# Patient Record
Sex: FEMALE | Race: OTHER | NOT HISPANIC OR LATINO
[De-identification: names, ages, dates, MRNs, and addresses within clinical notes are randomized per-mention and may not be internally consistent; named-entity substitution may affect disease eponyms.]

---

## 2019-02-15 ENCOUNTER — APPOINTMENT (OUTPATIENT)
Dept: ENDOCRINOLOGY | Facility: CLINIC | Age: 79
End: 2019-02-15
Payer: MEDICARE

## 2019-02-15 VITALS
SYSTOLIC BLOOD PRESSURE: 147 MMHG | HEART RATE: 59 BPM | BODY MASS INDEX: 22.63 KG/M2 | HEIGHT: 62 IN | WEIGHT: 123 LBS | DIASTOLIC BLOOD PRESSURE: 65 MMHG

## 2019-02-15 PROCEDURE — 99205 OFFICE O/P NEW HI 60 MIN: CPT

## 2019-02-19 LAB
24R-OH-CALCIDIOL SERPL-MCNC: 131 PG/ML
25(OH)D3 SERPL-MCNC: 23.9 NG/ML
ALBUMIN SERPL ELPH-MCNC: 4.4 G/DL
ALP BLD-CCNC: 123 U/L
ALP BONE SERPL-MCNC: 32 MCG/L
ALT SERPL-CCNC: 13 U/L
ANION GAP SERPL CALC-SCNC: 10 MMOL/L
AST SERPL-CCNC: 11 U/L
BILIRUB SERPL-MCNC: 0.4 MG/DL
BUN SERPL-MCNC: 10 MG/DL
CALCIUM SERPL-MCNC: 11.6 MG/DL
CHLORIDE SERPL-SCNC: 109 MMOL/L
CHOLEST SERPL-MCNC: 180 MG/DL
CHOLEST/HDLC SERPL: 2.9 RATIO
CO2 SERPL-SCNC: 26 MMOL/L
CREAT SERPL-MCNC: 0.67 MG/DL
CREAT SPEC-SCNC: 49 MG/DL
GLUCOSE SERPL-MCNC: 107 MG/DL
HBA1C MFR BLD HPLC: 7.1 %
HDLC SERPL-MCNC: 62 MG/DL
LDLC SERPL CALC-MCNC: 105 MG/DL
MICROALBUMIN 24H UR DL<=1MG/L-MCNC: <1.2 MG/DL
MICROALBUMIN/CREAT 24H UR-RTO: NORMAL
POTASSIUM SERPL-SCNC: 4.6 MMOL/L
PROT SERPL-MCNC: 6.7 G/DL
SODIUM SERPL-SCNC: 145 MMOL/L
TRIGL SERPL-MCNC: 66 MG/DL

## 2019-02-25 NOTE — CONSULT LETTER
[Dear  ___] : Dear  [unfilled], [Consult Letter:] : I had the pleasure of evaluating your patient, [unfilled]. [Please see my note below.] : Please see my note below. [Consult Closing:] : Thank you very much for allowing me to participate in the care of this patient.  If you have any questions, please do not hesitate to contact me. [Sincerely,] : Sincerely, [FreeTextEntry1] : Ms. Schaeffer has good control of her sugars; her A1c is 7.1%.  I switched her Jardiance because of insurance reasons.  I gave her samples and so far, she is not having any side effects to Jardiance.\par She also has hypercalcemia from primary hyperparathyroidism.  Because of her age, I am not eager to send her for surgery, but her calcium levels are higher than I would like (they are over 11.5).  I advised her to avoid dehydration, which will increase calcium levels, and she should keep her vitamin D replete (i recommended she start taking 400 IU/day).  I am also sending her for a bone density to see if she has osteoporosis.\par \par  [FreeTextEntry3] : Najma Del Angel MD\par Division of Endocrinology\par [facility] Physician [facility]

## 2019-02-25 NOTE — ASSESSMENT
[FreeTextEntry1] : Diabetes, A1c at goal (goal < 7.5%).  continue current regimen, ok to change SGLT2 to whichever one is covered by her insurance.  if Farxiga not covered, will try Jardiance 10mg, samples given.  [employer] did report a small risk of fractures in one of their studies, but I believe a repeat study did not have this finding.\par can reduce testing to 4x/week: twice in the morning, twice after meals/bedtime\par \par Hypercalcemia, due to primary hyperparathyroidism. Her calcium level is quite high, and higher than what is usually considered comfortable for monitoring (over 11.5). However, at her age, I am not eager to send her for surgery (parathyroidectomy).  Advised to keep well hydrated, as dehydration will exacerbate hypercalcemia.  Keep vitamin D replete, goal 25 D > 25 ng/ml.   send for bone density to check for osteoporosis.  if bone density is very low, or if calcium is not stable (going over 12.0), then will refer for surgical evaluation. \par RTO 3 months

## 2019-02-25 NOTE — HISTORY OF PRESENT ILLNESS
[FreeTextEntry1] : Diabetes diagnosed in 1998 after presenting to GYN with recurrent yeast infections.  Both parents had diabetes and son was diagnosed with diabetes a few years ago (he is 61yo now).\par tests glucose 1-2x/day.\par Ultra 2 meter: 14d 130 (n14), 30d 125 (n27). \par testing mostly after she wakes up: 133 (today), 159, 126, 120, 103, 140, 107, 97, 136, 87\par pm: 121 (7p), 99 (12p), 268 (3p)\par feels "funny" when glucose are high but no dizzines or blurry vision.\par no hypoglycemia.  has hypo awareness.\par always urinates a lot.  drinking 4 500ml bottles of water per day\par no neuropathy symptom.  she used to get stabbing pains in her abdomen but this stopped after taking folate\par up to date with ophtho, no DR. has glaucoma\par no chest pain or SOB.\par has known elevated calcium for years.  last bone density was a while ago. no recent falls, no h/o fractures. no h/o stones\par had to pay out of pocket for Farxiga last month, over $500.  used to be on Invokana for 8-9 months but then stopped by Dr Pereira because of possible inc fracture risk.  then changed to farxiga. She did not have any side effects to either med.  has been on Farxiga the past 2 years.\par \par PMH: type 2 diabetes \par hypercalcemia due to primary hyperparathyroidism.  Ca 11-12 range.\par \par Meds:\par Farxiga 5mg, Onglyza 5mg hs\par repaglidine takes with lunch and dinner\par Benicar/HCT 40/12.5\par cod liver oil, 1 tablespoon/day

## 2019-02-25 NOTE — PHYSICAL EXAM
[Alert] : alert [Healthy Appearance] : healthy appearance [Normal Voice/Communication] : normal voice communication [No Proptosis] : no proptosis [No Lid Lag] : no lid lag [Normal Hearing] : hearing was normal [Thyroid Not Enlarged] : the thyroid was not enlarged [No Thyroid Nodules] : there were no palpable thyroid nodules [Clear to Auscultation] : lungs were clear to auscultation bilaterally [Normal S1, S2] : normal S1 and S2 [Regular Rhythm] : with a regular rhythm [Pedal Pulses Normal] : the pedal pulses are present [No Edema] : there was no peripheral edema [Normal Sensation on Monofilament Testing] : normal sensation on monofilament testing of lower extremities [Normal Affect] : the affect was normal [Normal Mood] : the mood was normal [Foot Ulcers] : no foot ulcers [de-identified] : regular bradycardia [de-identified] : dystrophic nails

## 2019-02-25 NOTE — DATA REVIEWED
[FreeTextEntry1] : 7/18: A1c 6.8%, Ca 11.4, tot chol 204, trig 102, HDL 60, , Cr 0.79, urine microalbumin/ Cr 10.1\par 3/18: A1c 7.1%, Ca 12.1, Cr 0.80, tot chol 223, trig 56, HDL 71, , TSH 0.79\par 10/17: A1c 7.5%, Ca 12.0, , Cr 0.70, 25D 19

## 2019-03-15 ENCOUNTER — RESULT REVIEW (OUTPATIENT)
Age: 79
End: 2019-03-15

## 2019-03-15 RX ORDER — RISEDRONATE SODIUM 150 MG/1
150 TABLET, FILM COATED ORAL
Qty: 3 | Refills: 3 | Status: ACTIVE | COMMUNITY
Start: 2019-03-15 | End: 1900-01-01

## 2019-03-29 ENCOUNTER — CLINICAL ADVICE (OUTPATIENT)
Age: 79
End: 2019-03-29

## 2019-03-29 RX ORDER — EMPAGLIFLOZIN 10 MG/1
10 TABLET, FILM COATED ORAL
Qty: 30 | Refills: 5 | Status: DISCONTINUED | OUTPATIENT
Start: 2019-02-15 | End: 2019-03-29

## 2019-03-29 RX ORDER — DAPAGLIFLOZIN 5 MG/1
5 TABLET, FILM COATED ORAL
Qty: 30 | Refills: 5 | Status: ACTIVE | COMMUNITY
Start: 2019-03-29 | End: 1900-01-01

## 2019-04-25 ENCOUNTER — CLINICAL ADVICE (OUTPATIENT)
Age: 79
End: 2019-04-25

## 2019-08-09 ENCOUNTER — APPOINTMENT (OUTPATIENT)
Dept: ENDOCRINOLOGY | Facility: CLINIC | Age: 79
End: 2019-08-09

## 2019-09-30 ENCOUNTER — APPOINTMENT (OUTPATIENT)
Dept: OTOLARYNGOLOGY | Facility: CLINIC | Age: 79
End: 2019-09-30
Payer: MEDICARE

## 2019-09-30 VITALS
HEART RATE: 50 BPM | DIASTOLIC BLOOD PRESSURE: 79 MMHG | OXYGEN SATURATION: 98 % | RESPIRATION RATE: 16 BRPM | SYSTOLIC BLOOD PRESSURE: 150 MMHG | TEMPERATURE: 98.3 F

## 2019-09-30 PROCEDURE — 31575 DIAGNOSTIC LARYNGOSCOPY: CPT

## 2019-09-30 PROCEDURE — 76536 US EXAM OF HEAD AND NECK: CPT

## 2019-09-30 PROCEDURE — 99205 OFFICE O/P NEW HI 60 MIN: CPT | Mod: 25

## 2019-09-30 PROCEDURE — 99204 OFFICE O/P NEW MOD 45 MIN: CPT | Mod: 25

## 2019-10-01 NOTE — PROCEDURE
[Image(s) Captured] : image(s) captured and filed [Gag Reflex] : gag reflex preventing mirror examination [Unable to Cooperate with Mirror] : patient unable to cooperate with mirror [Topical Lidocaine] : topical lidocaine [None] : none [Serial Number: ___] : Serial Number: [unfilled] [FreeTextEntry3] : \par [facility]\par THYROID/NECK ULTRASOUND REPORT\par \par NAME: AMELIA DESAI   ..       MR# [id number] ..  : 1940 .     .DATE: 2019\par \par HISTORY/ INDICATIONS: An 79-year-old female with well documented primary hyperparathyroidism and osteoporosis to assess the thyroid gland for nodularity or a parathyroid adenoma.\par \par COMPARISON: None\par \par PROCEDURE: Physician performed high-resolution ultrasound gray scale imaging and color Doppler supplementation of the thyroid gland and neck was obtained in the longitudinal and transverse planes using a 13 MHz linear transducer with image capture.  All measurements are in centimeters (longitudinal x AP x transverse).  \par \par FINDINGS: Overall the thyroid gland is normal in size, heterogeneous in echotexture with bilateral subcentimeter thyroid nodules and a good candidate for a left inferior parathyroid adenoma. Vascularity is normal on color Doppler flow. \par \par RIGHT LOBE: Is not enlarged, heterogeneous, with normal vascularity on color Doppler and measures 4.39 x 1.20 x 2.00 cm.  NODULES: Within the right superior lobe there is an anterior smoothly marginated mildly hypoechoic nodule that is wider than tall with grade 1 vascularity and measures 0.41 x 0.16 x 0.41 cm. 2 additional identical appearing nodules were noted in the right mid posterior lobe and right inferior lobe measuring 0.41 x 0.22 x 0.4 to and 0.34 x 0.20 x 0.46 cm.  None of these nodules contain microcalcifications are likely benign colloid nodules.\par \par ISTHMUS: Measures 0.15 cm in AP dimension and is heterogeneous in echotexture with normal vascularity.  No nodules are identified.\par \par LEFT LOBE: Is not enlarged, heterogeneous, with normal vascularity on color Doppler and measures 4.50 x 1.35 x 1.85 cm. NODULES:Within the left mid to upper lobe is a well circumscribed, hypoechoic solid nodule that is wider than tall with grade 1 vascularity and measures 0.30 x 0.17 x 0.29 CM without microcalcifications. A second mildly hypoechoic smoothly marginated solid nodule is identified in the left mid to inferior lobe posteriorly and just anterior and adjacent to a probable subcapsular parathyroid adenoma.It has grade 1 vascularity, is wider than tall, and measures 0.66 x 0.32 x 0.59 cm. It contains several punctate echogenic foci that may represent colloid crystals or microcalcifications. \par \par PARATHYROID GLANDS: Posterior to the left mid to lower thyroid lobe is an oblong, smoothly marginated, hypoechoic structure with an echogenic line of separation from an overlying mildly hypoechoic heterogeneous subcentimeter thyroid nodule that has a vascular pedicle and measures 1.15 x 0.26 x 0.73 cm. This is a likely candidate for a parathyroid adenoma that may be subcapsular. There are no other identified enlarged parathyroid glands in the central neck compartment. \par \par LYMPH NODES: There are several benign appearing subcentimeter lymph nodes identified at neck levels III bilaterally, all with echogenic hilar lines and a short long axis ratio < 0.5 in the transverse plane.\par \par IMPRESSION: 79-year-old female with well documented primary hyperparathyroidism and evidence for a left mid to lower positioned parathyroid adenoma and subcentimeter thyroid nodules bilaterally. \par \par RECOMMENDATIONS: A  4-D CT scan of the neck should be obtained for further confirmation prior to a minimally invasive parathyroidectomy. \par \par Electronically signed by Ned Harman MD on 19, TIME: 3:01 PM [de-identified] : The nasal septum is minimally deviated to the right. There are no masses or polyps and the nasal mucosa and secretions are normal. The choanae and posterior nasopharynx are normal without masses or drainage. The Eustachian tube orifices appear patent. The pharynx, including the posterior and lateral pharyngeal walls, the vallecula and base of tongue are normal without ulcerations, lesions or masses. The hypopharynx including the pyriform sinuses open well without pooling of secretions, mucosal lesions or masses. The supraglottic larynx including the epiglottis, petiole, arytenoids, glossoepiglottic, aryepiglottic and pharyngoepiglottic folds are normal without mucosal lesions, ulcerations or masses. The glottis reveals normal false vocal folds. The true vocal folds are glistening white, tense and of equal length, without paralysis, having symmetric mobility on adduction and abduction. There are no mucosal lesions, nodules, cysts, erythroplasia or leukoplakia. The posterior cricoid area has healthy pink mucosa in the interarytenoid area and esophageal inlet. There is mild thickening/edema of the interarytenoid mucosa suggestive of posterior laryngitis from laryngopharyngeal acid reflux disease. The trachea is clear without narrowing in the immediate subglottic region, without deviation or lesions. \par  [de-identified] : preoperative assessment for parathyroidectomy

## 2019-10-01 NOTE — HISTORY OF PRESENT ILLNESS
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  Her last serum calcium was as high as 11.6 pg/ml with an elevated iPTH to 263 pg/ml, elevated Alkaline phosphatase  and has normal renal function. Vitamin D 25-OH total is low at 19 ng/ml. She has osteoporosis and bone/joint pain.  Her DEXA scan showed t scores of -3.2, -2.7 and -3.3 in the lumbar spine, left total hip and left radius respectively. [place] denies recent shortness of breath, voice changes, dysphagia, anterior neck pain, neck pressure or mass. There is no family history of thyroid cancer. She denies any known radiation exposures in her youth.   She denies calcium, vitamin D supplements, HCTZ or past use of Lithium Carbonate. There is no family history of nephrolithiasis or renal disease.  There is no history of fragility bone fractures. Other than fatigue,  generalized bone aches, joint pain, polyuria/ polydipsia, and constipation she denies depression, memory loss, brain fog, nausea, vomiting, abdominal pain, nephrolithiasis, peptic ulcer disease, pancreatitis or GERD. Her weight has been stable and she is euthyroid. She has controlled HTN and type 2 DM.

## 2019-10-01 NOTE — DATA REVIEWED
[de-identified] : see HPI [de-identified] : T [de-identified] : Bone density study reviewed, endocrinologist

## 2019-10-01 NOTE — REASON FOR VISIT
[FreeTextEntry2] : primary hyperparathyroidism and osteoporosis for surgical consultation.  [FreeTextEntry1] : Referred by Bakari Becker MD Endocrinologist,  PCP is Aaron Grimes MD [address]

## 2019-11-01 ENCOUNTER — MOBILE ON CALL (OUTPATIENT)
Age: 79
End: 2019-11-01

## 2019-11-07 VITALS
RESPIRATION RATE: 16 BRPM | OXYGEN SATURATION: 100 % | TEMPERATURE: 98 F | WEIGHT: 123.24 LBS | DIASTOLIC BLOOD PRESSURE: 82 MMHG | HEIGHT: 62 IN | SYSTOLIC BLOOD PRESSURE: 153 MMHG | HEART RATE: 53 BPM

## 2019-11-07 NOTE — ASU PATIENT PROFILE, ADULT - CENTRAL VENOUS CATHETER
Increase clear fluid intake  Treat any fever with ibuprofen or Tylenol  Bromfed as needed for cough  Prednisolone once daily x 5 days     Cleared for [employer] no

## 2019-11-07 NOTE — ASU PATIENT PROFILE, ADULT - PMH
DM (diabetes mellitus)    HTN (hypertension)    Hyperparathyroidism DM (diabetes mellitus)    HTN (hypertension)    Hyperparathyroidism    Spinal stenosis Breast cancer    DM (diabetes mellitus)    HTN (hypertension)    Hyperparathyroidism    Spinal stenosis

## 2019-11-08 ENCOUNTER — OUTPATIENT (OUTPATIENT)
Dept: OUTPATIENT SERVICES | Facility: HOSPITAL | Age: 79
LOS: 1 days | Discharge: HOME | End: 2019-11-08
Payer: MEDICARE

## 2019-11-08 ENCOUNTER — APPOINTMENT (OUTPATIENT)
Dept: OTOLARYNGOLOGY | Facility: HOSPITAL | Age: 79
End: 2019-11-08

## 2019-11-08 ENCOUNTER — RESULT REVIEW (OUTPATIENT)
Age: 79
End: 2019-11-08

## 2019-11-08 DIAGNOSIS — Z98.890 OTHER SPECIFIED POSTPROCEDURAL STATES: Chronic | ICD-10-CM

## 2019-11-08 DIAGNOSIS — Z90.711 ACQUIRED ABSENCE OF UTERUS WITH REMAINING CERVICAL STUMP: Chronic | ICD-10-CM

## 2019-11-08 LAB
ALBUMIN SERPL ELPH-MCNC: 4 G/DL — SIGNIFICANT CHANGE UP (ref 3.3–5)
CALCIUM SERPL-MCNC: 10.5 MG/DL — SIGNIFICANT CHANGE UP (ref 8.4–10.5)
GLUCOSE BLDC GLUCOMTR-MCNC: 122 MG/DL — HIGH (ref 70–99)
GLUCOSE BLDC GLUCOMTR-MCNC: 162 MG/DL — HIGH (ref 70–99)
GLUCOSE BLDC GLUCOMTR-MCNC: 168 MG/DL — HIGH (ref 70–99)
GLUCOSE BLDC GLUCOMTR-MCNC: 169 MG/DL — HIGH (ref 70–99)
GLUCOSE BLDC GLUCOMTR-MCNC: 211 MG/DL — HIGH (ref 70–99)
GLUCOSE BLDC GLUCOMTR-MCNC: 239 MG/DL — HIGH (ref 70–99)
GLUCOSE BLDC GLUCOMTR-MCNC: <10 MG/DL — CRITICAL LOW (ref 70–99)
MAGNESIUM SERPL-MCNC: 1.9 MG/DL — SIGNIFICANT CHANGE UP (ref 1.6–2.6)
PHOSPHATE SERPL-MCNC: 3.2 MG/DL — SIGNIFICANT CHANGE UP (ref 2.5–4.5)
PTH-INTACT IO % DIF SERPL: 10.5 PG/ML — SIGNIFICANT CHANGE UP (ref 8.5–72.5)
PTH-INTACT IO % DIF SERPL: 23.1 PG/ML — SIGNIFICANT CHANGE UP (ref 8.5–72.5)
PTH-INTACT IO % DIF SERPL: 29.8 PG/ML — SIGNIFICANT CHANGE UP (ref 8.5–72.5)
PTH-INTACT IO % DIF SERPL: 333 PG/ML — HIGH (ref 8.5–72.5)
PTH-INTACT IO % DIF SERPL: 38.5 PG/ML — SIGNIFICANT CHANGE UP (ref 8.5–72.5)

## 2019-11-08 PROCEDURE — 60500 EXPLORE PARATHYROID GLANDS: CPT | Mod: GC

## 2019-11-08 RX ORDER — HYDROMORPHONE HYDROCHLORIDE 2 MG/ML
0.25 INJECTION INTRAMUSCULAR; INTRAVENOUS; SUBCUTANEOUS ONCE
Refills: 0 | Status: DISCONTINUED | OUTPATIENT
Start: 2019-11-08 | End: 2019-11-08

## 2019-11-08 RX ORDER — GLUCAGON INJECTION, SOLUTION 0.5 MG/.1ML
1 INJECTION, SOLUTION SUBCUTANEOUS ONCE
Refills: 0 | Status: DISCONTINUED | OUTPATIENT
Start: 2019-11-08 | End: 2019-11-09

## 2019-11-08 RX ORDER — SODIUM CHLORIDE 9 MG/ML
1000 INJECTION, SOLUTION INTRAVENOUS
Refills: 0 | Status: DISCONTINUED | OUTPATIENT
Start: 2019-11-08 | End: 2019-11-09

## 2019-11-08 RX ORDER — ACETAMINOPHEN 500 MG
650 TABLET ORAL EVERY 6 HOURS
Refills: 0 | Status: DISCONTINUED | OUTPATIENT
Start: 2019-11-08 | End: 2019-11-09

## 2019-11-08 RX ORDER — BENZOCAINE AND MENTHOL 5; 1 G/100ML; G/100ML
1 LIQUID ORAL ONCE
Refills: 0 | Status: COMPLETED | OUTPATIENT
Start: 2019-11-08 | End: 2019-11-08

## 2019-11-08 RX ORDER — DEXTROSE 50 % IN WATER 50 %
15 SYRINGE (ML) INTRAVENOUS ONCE
Refills: 0 | Status: DISCONTINUED | OUTPATIENT
Start: 2019-11-08 | End: 2019-11-09

## 2019-11-08 RX ORDER — INSULIN LISPRO 100/ML
VIAL (ML) SUBCUTANEOUS AT BEDTIME
Refills: 0 | Status: DISCONTINUED | OUTPATIENT
Start: 2019-11-08 | End: 2019-11-09

## 2019-11-08 RX ORDER — HYDROMORPHONE HYDROCHLORIDE 2 MG/ML
0.5 INJECTION INTRAMUSCULAR; INTRAVENOUS; SUBCUTANEOUS EVERY 4 HOURS
Refills: 0 | Status: DISCONTINUED | OUTPATIENT
Start: 2019-11-08 | End: 2019-11-09

## 2019-11-08 RX ORDER — DEXTROSE 50 % IN WATER 50 %
25 SYRINGE (ML) INTRAVENOUS ONCE
Refills: 0 | Status: DISCONTINUED | OUTPATIENT
Start: 2019-11-08 | End: 2019-11-09

## 2019-11-08 RX ORDER — LOSARTAN POTASSIUM 100 MG/1
100 TABLET, FILM COATED ORAL ONCE
Refills: 0 | Status: DISCONTINUED | OUTPATIENT
Start: 2019-11-08 | End: 2019-11-09

## 2019-11-08 RX ORDER — DEXTROSE 50 % IN WATER 50 %
12.5 SYRINGE (ML) INTRAVENOUS ONCE
Refills: 0 | Status: DISCONTINUED | OUTPATIENT
Start: 2019-11-08 | End: 2019-11-09

## 2019-11-08 RX ORDER — INSULIN LISPRO 100/ML
VIAL (ML) SUBCUTANEOUS
Refills: 0 | Status: DISCONTINUED | OUTPATIENT
Start: 2019-11-08 | End: 2019-11-09

## 2019-11-08 RX ADMIN — BENZOCAINE AND MENTHOL 1 LOZENGE: 5; 1 LIQUID ORAL at 21:45

## 2019-11-08 RX ADMIN — Medication 2: at 12:12

## 2019-11-08 RX ADMIN — Medication 650 MILLIGRAM(S): at 23:03

## 2019-11-08 RX ADMIN — HYDROMORPHONE HYDROCHLORIDE 0.25 MILLIGRAM(S): 2 INJECTION INTRAMUSCULAR; INTRAVENOUS; SUBCUTANEOUS at 12:55

## 2019-11-08 RX ADMIN — HYDROMORPHONE HYDROCHLORIDE 0.25 MILLIGRAM(S): 2 INJECTION INTRAMUSCULAR; INTRAVENOUS; SUBCUTANEOUS at 12:37

## 2019-11-08 NOTE — BRIEF OPERATIVE NOTE - OPERATION/FINDINGS
right upper lobe parathyroidectomy, contralateral neck exploration yielded 2 normal appearing parathyroid glands, intra operative PTH decreased > 50% after specimen removal

## 2019-11-08 NOTE — PROGRESS NOTE ADULT - ASSESSMENT
A/P: 79y Female s/p parathyroidectomy    Diet: CLD  Pain/nausea control  ISS  SCD/OOBA  Post-op Labs  Citracal & Rocaltrol

## 2019-11-08 NOTE — PROGRESS NOTE ADULT - SUBJECTIVE AND OBJECTIVE BOX
POST-OPERATIVE NOTE    Procedure: Parathyroidectomy    Diagnosis/Indication: Primary Hyperparathyroidism    Surgeon: Dr. Harman    S: Pt has no complaints. Denies neck pain, dysphagia, dyspnea, coughing, perioral tingling, carpopedal spasms, chest pain. Pain controlled with medication. Denies nausea, vomiting.    O:  T(C): --  T(F): --  HR: 60 (11-08-19 @ 14:00) (56 - 60)  BP: 135/65 (11-08-19 @ 14:00) (130/62 - 144/61)  RR: 16 (11-08-19 @ 14:00) (11 - 16)  SpO2: 96% (11-08-19 @ 14:00) (96% - 100%)  Wt(kg): --      Ca    10.5      08 Nov 2019 15:15  Phos  3.2     11-08  Mg     1.9     11-08    TPro  x   /  Alb  4.0  /  TBili  x   /  DBili  x   /  AST  x   /  ALT  x   /  AlkPhos  x   11-08      Gen: NAD  HEENT: NCAT, trachea midline, incision w. dressing  C/V: NSR  Pulm: Nonlabored breathing, no respiratory distress  Abd: soft, NT/ND  Extrem: WWP, no calf edema or tenderness, SCDs in place

## 2019-11-09 VITALS — SYSTOLIC BLOOD PRESSURE: 149 MMHG | DIASTOLIC BLOOD PRESSURE: 69 MMHG | HEART RATE: 75 BPM

## 2019-11-09 LAB
ALBUMIN SERPL ELPH-MCNC: 3.6 G/DL — SIGNIFICANT CHANGE UP (ref 3.3–5)
ALBUMIN SERPL ELPH-MCNC: 3.9 G/DL — SIGNIFICANT CHANGE UP (ref 3.3–5)
ALP SERPL-CCNC: 125 U/L — HIGH (ref 40–120)
ALP SERPL-CCNC: 136 U/L — HIGH (ref 40–120)
ALT FLD-CCNC: 10 U/L — SIGNIFICANT CHANGE UP (ref 10–45)
ALT FLD-CCNC: 11 U/L — SIGNIFICANT CHANGE UP (ref 10–45)
ANION GAP SERPL CALC-SCNC: 12 MMOL/L — SIGNIFICANT CHANGE UP (ref 5–17)
ANION GAP SERPL CALC-SCNC: 9 MMOL/L — SIGNIFICANT CHANGE UP (ref 5–17)
AST SERPL-CCNC: 11 U/L — SIGNIFICANT CHANGE UP (ref 10–40)
AST SERPL-CCNC: 12 U/L — SIGNIFICANT CHANGE UP (ref 10–40)
BILIRUB SERPL-MCNC: 0.5 MG/DL — SIGNIFICANT CHANGE UP (ref 0.2–1.2)
BILIRUB SERPL-MCNC: 0.7 MG/DL — SIGNIFICANT CHANGE UP (ref 0.2–1.2)
BUN SERPL-MCNC: 10 MG/DL — SIGNIFICANT CHANGE UP (ref 7–23)
BUN SERPL-MCNC: 13 MG/DL — SIGNIFICANT CHANGE UP (ref 7–23)
CALCIUM SERPL-MCNC: 12.3 MG/DL — HIGH (ref 8.4–10.5)
CALCIUM SERPL-MCNC: 12.3 MG/DL — HIGH (ref 8.4–10.5)
CHLORIDE SERPL-SCNC: 103 MMOL/L — SIGNIFICANT CHANGE UP (ref 96–108)
CHLORIDE SERPL-SCNC: 104 MMOL/L — SIGNIFICANT CHANGE UP (ref 96–108)
CO2 SERPL-SCNC: 22 MMOL/L — SIGNIFICANT CHANGE UP (ref 22–31)
CO2 SERPL-SCNC: 27 MMOL/L — SIGNIFICANT CHANGE UP (ref 22–31)
CREAT SERPL-MCNC: 0.54 MG/DL — SIGNIFICANT CHANGE UP (ref 0.5–1.3)
CREAT SERPL-MCNC: 0.56 MG/DL — SIGNIFICANT CHANGE UP (ref 0.5–1.3)
GLUCOSE BLDC GLUCOMTR-MCNC: 176 MG/DL — HIGH (ref 70–99)
GLUCOSE SERPL-MCNC: 192 MG/DL — HIGH (ref 70–99)
GLUCOSE SERPL-MCNC: 218 MG/DL — HIGH (ref 70–99)
HBA1C BLD-MCNC: 7.5 % — HIGH (ref 4–5.6)
HCT VFR BLD CALC: 44.4 % — SIGNIFICANT CHANGE UP (ref 34.5–45)
HGB BLD-MCNC: 14.6 G/DL — SIGNIFICANT CHANGE UP (ref 11.5–15.5)
MAGNESIUM SERPL-MCNC: 1.8 MG/DL — SIGNIFICANT CHANGE UP (ref 1.6–2.6)
MAGNESIUM SERPL-MCNC: 1.8 MG/DL — SIGNIFICANT CHANGE UP (ref 1.6–2.6)
MCHC RBC-ENTMCNC: 27.5 PG — SIGNIFICANT CHANGE UP (ref 27–34)
MCHC RBC-ENTMCNC: 32.9 GM/DL — SIGNIFICANT CHANGE UP (ref 32–36)
MCV RBC AUTO: 83.6 FL — SIGNIFICANT CHANGE UP (ref 80–100)
NRBC # BLD: 0 /100 WBCS — SIGNIFICANT CHANGE UP (ref 0–0)
PHOSPHATE SERPL-MCNC: 3.8 MG/DL — SIGNIFICANT CHANGE UP (ref 2.5–4.5)
PHOSPHATE SERPL-MCNC: 3.9 MG/DL — SIGNIFICANT CHANGE UP (ref 2.5–4.5)
PLATELET # BLD AUTO: 85 K/UL — LOW (ref 150–400)
POTASSIUM SERPL-MCNC: 4.1 MMOL/L — SIGNIFICANT CHANGE UP (ref 3.5–5.3)
POTASSIUM SERPL-MCNC: 4.2 MMOL/L — SIGNIFICANT CHANGE UP (ref 3.5–5.3)
POTASSIUM SERPL-SCNC: 4.1 MMOL/L — SIGNIFICANT CHANGE UP (ref 3.5–5.3)
POTASSIUM SERPL-SCNC: 4.2 MMOL/L — SIGNIFICANT CHANGE UP (ref 3.5–5.3)
PROT SERPL-MCNC: 6.5 G/DL — SIGNIFICANT CHANGE UP (ref 6–8.3)
PROT SERPL-MCNC: 7.2 G/DL — SIGNIFICANT CHANGE UP (ref 6–8.3)
PTH-INTACT FLD-MCNC: 4 PG/ML — LOW (ref 15–65)
PTH-INTACT IO % DIF SERPL: 6.3 PG/ML — LOW (ref 8.5–72.5)
RBC # BLD: 5.31 M/UL — HIGH (ref 3.8–5.2)
RBC # FLD: 15 % — HIGH (ref 10.3–14.5)
SODIUM SERPL-SCNC: 137 MMOL/L — SIGNIFICANT CHANGE UP (ref 135–145)
SODIUM SERPL-SCNC: 140 MMOL/L — SIGNIFICANT CHANGE UP (ref 135–145)
WBC # BLD: 11.72 K/UL — HIGH (ref 3.8–10.5)
WBC # FLD AUTO: 11.72 K/UL — HIGH (ref 3.8–10.5)

## 2019-11-09 PROCEDURE — 83970 ASSAY OF PARATHORMONE: CPT

## 2019-11-09 PROCEDURE — 82310 ASSAY OF CALCIUM: CPT

## 2019-11-09 PROCEDURE — 83735 ASSAY OF MAGNESIUM: CPT

## 2019-11-09 PROCEDURE — 83036 HEMOGLOBIN GLYCOSYLATED A1C: CPT

## 2019-11-09 PROCEDURE — C1889: CPT

## 2019-11-09 PROCEDURE — 82040 ASSAY OF SERUM ALBUMIN: CPT

## 2019-11-09 PROCEDURE — 88305 TISSUE EXAM BY PATHOLOGIST: CPT

## 2019-11-09 PROCEDURE — 36415 COLL VENOUS BLD VENIPUNCTURE: CPT

## 2019-11-09 PROCEDURE — 85027 COMPLETE CBC AUTOMATED: CPT

## 2019-11-09 PROCEDURE — 60500 EXPLORE PARATHYROID GLANDS: CPT

## 2019-11-09 PROCEDURE — 82962 GLUCOSE BLOOD TEST: CPT

## 2019-11-09 PROCEDURE — 80053 COMPREHEN METABOLIC PANEL: CPT

## 2019-11-09 PROCEDURE — 84100 ASSAY OF PHOSPHORUS: CPT

## 2019-11-09 RX ORDER — LOSARTAN POTASSIUM 100 MG/1
100 TABLET, FILM COATED ORAL DAILY
Refills: 0 | Status: DISCONTINUED | OUTPATIENT
Start: 2019-11-09 | End: 2019-11-09

## 2019-11-09 RX ORDER — MAGNESIUM SULFATE 500 MG/ML
1 VIAL (ML) INJECTION ONCE
Refills: 0 | Status: COMPLETED | OUTPATIENT
Start: 2019-11-09 | End: 2019-11-09

## 2019-11-09 RX ADMIN — Medication 650 MILLIGRAM(S): at 08:47

## 2019-11-09 RX ADMIN — Medication 1: at 07:41

## 2019-11-09 RX ADMIN — Medication 650 MILLIGRAM(S): at 07:47

## 2019-11-09 RX ADMIN — Medication 650 MILLIGRAM(S): at 00:03

## 2019-11-09 RX ADMIN — LOSARTAN POTASSIUM 100 MILLIGRAM(S): 100 TABLET, FILM COATED ORAL at 13:43

## 2019-11-09 NOTE — PROGRESS NOTE ADULT - ASSESSMENT
79y Female s/p parathyroidectomy.     Diet: CLD  Pain/nausea control  ISS  SCD/OOBA  Dispo 79y Female s/p parathyroidectomy.     Diet: regular  Pain/nausea control  ISS  SCD/OOBA  Dispo

## 2019-11-09 NOTE — PROGRESS NOTE ADULT - SUBJECTIVE AND OBJECTIVE BOX
POD: 1  Procedure: parathyroidectomy    SUBJECTIVE: Patient seen and examined by chief resident on morning rounds. Patient resting comfortably in bed with no complaints. No perioral numbness or tingling. No CP, SOB, dizziness, lightheadedness.       Vital Signs Last 24 Hrs  T(C): 37 (09 Nov 2019 08:20), Max: 37.2 (09 Nov 2019 05:41)  T(F): 98.6 (09 Nov 2019 08:20), Max: 99 (09 Nov 2019 05:41)  HR: 71 (09 Nov 2019 08:20) (54 - 79)  BP: 154/81 (09 Nov 2019 08:20) (124/59 - 155/84)  BP(mean): 80 (08 Nov 2019 16:00) (80 - 98)  RR: 15 (09 Nov 2019 08:20) (11 - 24)  SpO2: 97% (09 Nov 2019 08:20) (95% - 100%)    Physical Exam:  General: NAD  Pulmonary: Nonlabored breathing, no respiratory distress  Abdominal: soft, nondistended, nontender with no rebound or guarding  HEENT: dressing CDI, no hematoma appreciated, AREN ss   Extremities: WWP, normal strength, no clubbing/cyanosis/edema  Neuro: A/O x3    Lines/drains/tubes:    I&O's Summary    08 Nov 2019 07:01  -  09 Nov 2019 07:00  --------------------------------------------------------  IN: 140 mL / OUT: 2175 mL / NET: -2035 mL        LABS:                        14.6   11.72 )-----------( 85       ( 09 Nov 2019 06:44 )             44.4     11-09    137  |  103  |  10  ----------------------------<  192<H>  4.2   |  22  |  0.54    Ca    12.3<H>      09 Nov 2019 06:44  Phos  3.9     11-09  Mg     1.8     11-09    TPro  7.2  /  Alb  3.9  /  TBili  0.7  /  DBili  x   /  AST  12  /  ALT  11  /  AlkPhos  136<H>  11-09        CAPILLARY BLOOD GLUCOSE      POCT Blood Glucose.: 176 mg/dL (09 Nov 2019 07:37)  POCT Blood Glucose.: 239 mg/dL (08 Nov 2019 21:45)  POCT Blood Glucose.: 169 mg/dL (08 Nov 2019 17:08)  POCT Blood Glucose.: 162 mg/dL (08 Nov 2019 15:49)  POCT Blood Glucose.: 168 mg/dL (08 Nov 2019 15:47)  POCT Blood Glucose.: <10 mg/dL (08 Nov 2019 15:44)  POCT Blood Glucose.: 211 mg/dL (08 Nov 2019 11:25)    LIVER FUNCTIONS - ( 09 Nov 2019 06:44 )  Alb: 3.9 g/dL / Pro: 7.2 g/dL / ALK PHOS: 136 U/L / ALT: 11 U/L / AST: 12 U/L / GGT: x             RADIOLOGY & ADDITIONAL STUDIES:

## 2019-11-10 LAB — CALCIUM SERPL-MCNC: 11.5 MG/DL — HIGH (ref 8.4–10.5)

## 2019-11-11 PROBLEM — I10 ESSENTIAL (PRIMARY) HYPERTENSION: Chronic | Status: ACTIVE | Noted: 2019-11-07

## 2019-11-11 PROBLEM — E11.9 TYPE 2 DIABETES MELLITUS WITHOUT COMPLICATIONS: Chronic | Status: ACTIVE | Noted: 2019-11-07

## 2019-11-11 PROBLEM — E21.3 HYPERPARATHYROIDISM, UNSPECIFIED: Chronic | Status: ACTIVE | Noted: 2019-11-07

## 2019-11-11 PROBLEM — C50.919 MALIGNANT NEOPLASM OF UNSPECIFIED SITE OF UNSPECIFIED FEMALE BREAST: Chronic | Status: ACTIVE | Noted: 2019-11-08

## 2019-11-11 PROBLEM — M48.00 SPINAL STENOSIS, SITE UNSPECIFIED: Chronic | Status: ACTIVE | Noted: 2019-11-07

## 2019-11-11 LAB — SURGICAL PATHOLOGY STUDY: SIGNIFICANT CHANGE UP

## 2019-11-14 ENCOUNTER — APPOINTMENT (OUTPATIENT)
Dept: OTOLARYNGOLOGY | Facility: CLINIC | Age: 79
End: 2019-11-14
Payer: MEDICARE

## 2019-11-14 VITALS
SYSTOLIC BLOOD PRESSURE: 179 MMHG | OXYGEN SATURATION: 98 % | HEART RATE: 70 BPM | TEMPERATURE: 98.3 F | DIASTOLIC BLOOD PRESSURE: 77 MMHG

## 2019-11-14 DIAGNOSIS — R49.9 UNSPECIFIED VOICE AND RESONANCE DISORDER: ICD-10-CM

## 2019-11-14 PROCEDURE — 31575 DIAGNOSTIC LARYNGOSCOPY: CPT | Mod: 58

## 2019-11-14 PROCEDURE — 99024 POSTOP FOLLOW-UP VISIT: CPT

## 2019-11-14 NOTE — REASON FOR VISIT
[FreeTextEntry2] : a first postop visit after primary hyperparathyroidism and parathyroidectomy [FreeTextEntry1] : Referred by Bakari Becker MD Endocrinologist,  PCP is Aaron Grimes MD [address]

## 2019-11-14 NOTE — PHYSICAL EXAM
[Normal] : no mass and no adenopathy [de-identified] : The neck is flat without seroma or hematoma. The subcuticular suture was removed. The voice is raspy.  A Chvostek sign was not present.

## 2019-11-14 NOTE — PROCEDURE
[Image(s) Captured] : image(s) captured and filed [Unable to Cooperate with Mirror] : patient unable to cooperate with mirror [Gag Reflex] : gag reflex preventing mirror examination [Hoarseness] : hoarseness not clearly evaluated by indirect laryngoscopy [None] : none [Topical Lidocaine] : topical lidocaine [Serial Number: ___] : Serial Number: [unfilled] [de-identified] : The nasal septum is minimally deviated to the right. There are no masses or polyps and the nasal mucosa and secretions are normal. The choanae and posterior nasopharynx are normal without masses or drainage. The Eustachian tube orifices appear patent. The pharynx, including the posterior and lateral pharyngeal walls, the vallecula and base of tongue are normal without ulcerations, lesions or masses. The hypopharynx including the pyriform sinuses open well without pooling of secretions, mucosal lesions or masses. The supraglottic larynx including the epiglottis, petiole, arytenoids, glossoepiglottic, aryepiglottic and pharyngoepiglottic folds are normal without mucosal lesions, ulcerations or masses. The glottis reveals normal false vocal folds. The true vocal folds are hyperemic but tense and of equal length, without paralysis, having symmetric mobility on adduction and abduction. There are no mucosal lesions, nodules, cysts, erythroplasia or leukoplakia. The posterior cricoid area has healthy pink mucosa in the interarytenoid area and esophageal inlet. There is mild thickening/edema of the interarytenoid mucosa suggestive of posterior laryngitis from laryngopharyngeal acid reflux disease. The trachea is clear without narrowing in the immediate subglottic region, without deviation or lesions. \par  [de-identified] : postoperative assessment  after bilateral neck exploration and subtotal parathyroidectomy

## 2019-11-14 NOTE — HISTORY OF PRESENT ILLNESS
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  Her last serum calcium was as high as 11.6 pg/ml with an elevated iPTH to 263 pg/ml, elevated Alkaline phosphatase  and has normal renal function. Vitamin D 25-OH total is low at 19 ng/ml. She has osteoporosis and bone/joint pain.  Her DEXA scan showed t scores of -3.2, -2.7 and -3.3 in the lumbar spine, left total hip and left radius respectively. [place] denies recent shortness of breath, voice changes, dysphagia, anterior neck pain, neck pressure or mass. There is no family history of thyroid cancer. She denies any known radiation exposures in her youth.   She denies calcium, vitamin D supplements, HCTZ or past use of Lithium Carbonate. There is no family history of nephrolithiasis or renal disease.  There is no history of fragility bone fractures. Other than fatigue,  generalized bone aches, joint pain, polyuria/ polydipsia, and constipation she denies depression, memory loss, brain fog, nausea, vomiting, abdominal pain, nephrolithiasis, peptic ulcer disease, pancreatitis or GERD. Her weight has been stable and she is euthyroid. She has controlled HTN and type 2 DM. \par  [FreeTextEntry1] : [place] had an uneventful parathyroidectomy on 11/08/19. She had an appropriate IOPTH drop into the normal range (333 to 23.1).  Surgical path is c/w a right low ling superior parathyroid adenoma (2.5 cm, 1.8 gm). A left inferior parathyroid gland was borderline enlarged and partially resected (0.02 g).  She denies paresthesias and is taking 1 Citracal tab BID.  She will have labs checked today. She complains of right muscle neck discomfort that improves with a hot bath. Her voice is minimally hoarse since surgery but improving.

## 2019-11-14 NOTE — CONSULT LETTER
[Dear  ___] : Dear  [unfilled], [Consult Letter:] : I had the pleasure of evaluating your patient, [unfilled]. [Please see my note below.] : Please see my note below. [Sincerely,] : Sincerely, [Consult Closing:] : Thank you very much for allowing me to participate in the care of this patient.  If you have any questions, please do not hesitate to contact me. [FreeTextEntry3] : \par Ned Harman M.D., FACS, ECNU\par Director [facility] Thy[facility] Parathyroid Surgery\par [facility]\par Certified in Thyroid/Parathyroid/Neck Ultrasound, ECNU/ AIUM\par \par , Department of Otolaryngology\par [facility] [employer] at [facility]\par

## 2019-11-18 LAB
25(OH)D3 SERPL-MCNC: 29.3 NG/ML
ALBUMIN SERPL ELPH-MCNC: 4 G/DL
ALP BLD-CCNC: 128 U/L
ALT SERPL-CCNC: 10 U/L
ANION GAP SERPL CALC-SCNC: 13 MMOL/L
AST SERPL-CCNC: 9 U/L
BILIRUB SERPL-MCNC: 0.4 MG/DL
BUN SERPL-MCNC: 11 MG/DL
CALCIUM SERPL-MCNC: 9.6 MG/DL
CALCIUM SERPL-MCNC: 9.6 MG/DL
CHLORIDE SERPL-SCNC: 101 MMOL/L
CO2 SERPL-SCNC: 25 MMOL/L
CREAT SERPL-MCNC: 0.66 MG/DL
GLUCOSE SERPL-MCNC: 212 MG/DL
PARATHYROID HORMONE INTACT: 37 PG/ML
POTASSIUM SERPL-SCNC: 4.4 MMOL/L
PROT SERPL-MCNC: 6.7 G/DL
SODIUM SERPL-SCNC: 139 MMOL/L

## 2020-02-25 ENCOUNTER — APPOINTMENT (OUTPATIENT)
Dept: OTOLARYNGOLOGY | Facility: CLINIC | Age: 80
End: 2020-02-25
Payer: MEDICARE

## 2020-02-25 VITALS
DIASTOLIC BLOOD PRESSURE: 82 MMHG | HEART RATE: 54 BPM | OXYGEN SATURATION: 99 % | RESPIRATION RATE: 17 BRPM | TEMPERATURE: 97.8 F | SYSTOLIC BLOOD PRESSURE: 163 MMHG

## 2020-02-25 PROCEDURE — 11900 INJECT SKIN LESIONS </W 7: CPT

## 2020-02-25 PROCEDURE — 99214 OFFICE O/P EST MOD 30 MIN: CPT | Mod: 25

## 2020-02-25 NOTE — REASON FOR VISIT
[FreeTextEntry2] : a follow up visit after parathyroidectomy primary hyperparathyroidism and parathyroidectomy [FreeTextEntry1] : Referred by Bakari Becker MD Endocrinologist,  PCP is Aaron Grimes MD [address]

## 2020-02-25 NOTE — HISTORY OF PRESENT ILLNESS
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  Her last serum calcium was as high as 11.6 pg/ml with an elevated iPTH to 263 pg/ml, elevated Alkaline phosphatase  and has normal renal function. Vitamin D 25-OH total is low at 19 ng/ml. She has osteoporosis and bone/joint pain.  Her DEXA scan showed t scores of -3.2, -2.7 and -3.3 in the lumbar spine, left total hip and left radius respectively. [place] denies recent shortness of breath, voice changes, dysphagia, anterior neck pain, neck pressure or mass. There is no family history of thyroid cancer. She denies any known radiation exposures in her youth.   She denies calcium, vitamin D supplements, HCTZ or past use of Lithium Carbonate. There is no family history of nephrolithiasis or renal disease.  There is no history of fragility bone fractures. Other than fatigue,  generalized bone aches, joint pain, polyuria/ polydipsia, and constipation she denies depression, memory loss, brain fog, nausea, vomiting, abdominal pain, nephrolithiasis, peptic ulcer disease, pancreatitis or GERD. Her weight has been stable and she is euthyroid. She has controlled HTN and type 2 DM. \par  [FreeTextEntry1] : [place] had an uneventful parathyroidectomy on 11/08/19. She had an appropriate IOPTH drop into the normal range (333 to 23.1).  Surgical path is c/w a right low lying superior parathyroid adenoma (2.5 cm, 1.8 gm). A left inferior parathyroid gland was borderline enlarged and partially resected (0.02 g). She is taking Vitamin D3 2,000 IU and caltrate 600 mg/ day.  She had seen Dr. Becker back and her calcium/PTH were normal in December. Her incision has become hypertrophic.

## 2020-06-11 ENCOUNTER — APPOINTMENT (OUTPATIENT)
Dept: OTOLARYNGOLOGY | Facility: CLINIC | Age: 80
End: 2020-06-11
Payer: MEDICARE

## 2020-06-11 VITALS
OXYGEN SATURATION: 98 % | DIASTOLIC BLOOD PRESSURE: 74 MMHG | TEMPERATURE: 97.4 F | SYSTOLIC BLOOD PRESSURE: 144 MMHG | HEART RATE: 63 BPM

## 2020-06-11 PROCEDURE — 76536 US EXAM OF HEAD AND NECK: CPT

## 2020-06-11 PROCEDURE — G0268 REMOVAL OF IMPACTED WAX MD: CPT

## 2020-06-11 PROCEDURE — 11900 INJECT SKIN LESIONS </W 7: CPT

## 2020-06-11 PROCEDURE — 99214 OFFICE O/P EST MOD 30 MIN: CPT | Mod: 25

## 2020-06-11 NOTE — HISTORY OF PRESENT ILLNESS
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  Her last serum calcium was as high as 11.6 pg/ml with an elevated iPTH to 263 pg/ml, elevated Alkaline phosphatase  and has normal renal function. Vitamin D 25-OH total is low at 19 ng/ml. She has osteoporosis and bone/joint pain.  Her DEXA scan showed t scores of -3.2, -2.7 and -3.3 in the lumbar spine, left total hip and left radius respectively. Virginia denies recent shortness of breath, voice changes, dysphagia, anterior neck pain, neck pressure or mass. There is no family history of thyroid cancer. She denies any known radiation exposures in her youth.   She denies calcium, vitamin D supplements, HCTZ or past use of Lithium Carbonate. There is no family history of nephrolithiasis or renal disease.  There is no history of fragility bone fractures. Other than fatigue,  generalized bone aches, joint pain, polyuria/ polydipsia, and constipation she denies depression, memory loss, brain fog, nausea, vomiting, abdominal pain, nephrolithiasis, peptic ulcer disease, pancreatitis or GERD. Her weight has been stable and she is euthyroid. She has controlled HTN and type 2 DM. \par  [FreeTextEntry1] : Virginia had an uneventful parathyroidectomy on 11/08/19. She had an appropriate IOPTH drop into the normal range (333 to 23.1).  Surgical path was c/w a right low lying superior parathyroid adenoma (2.5 cm, 1.8 gm). A left inferior parathyroid gland was borderline enlarged and partially resected (0.02 g). She is taking Vitamin D3 2,000 IU and Caltrate 600 mg/ day.  She had seen Dr. Becker back and her calcium/PTH were (10.2/25.4).  Her incision had become hypertrophic and was injected with Kenalog. She continues to complain of scar pruritus and thickening. She had an 8 mm dominant left mid-lower lobe thyroid nodule with microcalcifications that needs to be monitored for growth.  Overall she feels well but concerned about COVID infection.  She denies fever, body aches, cough, cyanosis, chest burning, anosmia or recent known COVID exposures.  All family members at home are well. She has a f/u visit with Dr. Becker as her HbA1C is elevated.

## 2020-06-11 NOTE — CONSULT LETTER
[Dear  ___] : Dear  [unfilled], [Consult Letter:] : I had the pleasure of evaluating your patient, [unfilled]. [Please see my note below.] : Please see my note below. [Consult Closing:] : Thank you very much for allowing me to participate in the care of this patient.  If you have any questions, please do not hesitate to contact me. [Sincerely,] : Sincerely, [FreeTextEntry3] : \par Ned Harman M.D., FACS, ECNU\par Director Center for Thyroid & Parathyroid Surgery\par [facility]\par Certified in Thyroid/Parathyroid/Neck Ultrasound, ECNU/ AIUM\par \par , Department of Otolaryngology\par [facility] [employer] at [facility]\par

## 2020-06-11 NOTE — PROCEDURE
[Same] : same as the Pre Op Dx. [Cerumen Impaction] : Cerumen Impaction [] : Removal of Cerumen [FreeTextEntry3] : \par [facility]\par THYROID/NECK ULTRASOUND REPORT\par \par NAME: AMELIA DESAI    ..        MR# [id number]..	              : 1940..	         DATE: 2020\par \par HISTORY/ INDICATIONS: 79-year-old female status post parathyroidectomy/bilateral neck exploration and autoimmune thyroiditis with multiple micro-nodules noted on preoperative ultrasound. A left mid to lower lobe nodule reportedly had microcalcifications for followup exam.\par \par COMPARISON: Outside report dated 10/31/19, [facility].\par \par PROCEDURE: Physician performed high-resolution ultrasound gray scale imaging and color Doppler supplementation of the thyroid gland and neck was obtained in the longitudinal and transverse planes using a 13 MHz linear transducer with image capture.  All measurements are in centimeters (longitudinal x AP x transverse).  \par \par FINDINGS: Overall the thyroid gland is normal in size,  heterogeneous in echotexture with slightly increased vascularity on color Doppler flow. \par \par RIGHT LOBE: Is not enlarged, heterogeneous, with slightly increased vascularity on color Doppler and measures 4.44 x 1.47 x 1.32 cm.  NODULES: Within the right upper lobe there is a smoothly marginated, hypoechoic nodule without microcalcifications that is wider than tall with grade 2 vascularity and measures 0.52 x 0.16 x 0.37 cm. a right mid lobe nodule is also hypoechoic, smoothly marginated, without microcalcifications, grade 2 vascularity and wider than tall measuring 0.45 x 0.19 x 0.39 cm.  There are several additional pseudo-micronodules without calcifications.\par \par ISTHMUS: Measures 0.22 cm in AP dimension and is heterogeneous in echotexture with normal vascularity.  No nodules are identified.\par \par LEFT LOBE: Is not enlarged, heterogeneous, with slightly increased vascularity on color Doppler and measures 3.44 x 1.64 x 1.72 cm. NODULES:Within the mid to lower lobe there is a smoothly marginated, heterogeneous, hypoechoic nodule, with grade 2 vascularity, and several punctate echogenic foci some having reverberation artifact and others without artifact that is wider than tall and measures 0.58 x 0.48 x 0.57 cm, slightly smaller than previously reported (was 8 x 6 x 4 mm). \par \par PARATHYROID GLANDS: There are no identified enlarged parathyroid glands in the central neck compartment. \par \par LYMPH NODES: There are several benign appearing subcentimeter lymph nodes identified at neck levels III bilaterally, all with echogenic hilar lines, no calcifications or cystic degeneration and have a short long axis ratio < 0.5 in the transverse plane.\par \par IMPRESSION: 79-year-old female with a small, heterogeneous thyroid gland with multiple bilateral subcentimeter pseudonodules consistent with Hashimoto's thyroiditis. A dominant left mid-lower lobe nodule is stable and smaller. \par \par RECOMMENDATIONS: Repeat thyroid ultrasound in 2-3 years as well as continued monitoring of TSH. \par \par Electronically signed by Ned Harman MD on 2020, 9:59 AM\par \par [facility]: [address], [address] [place] [address]\par [phone] (voice),  [phone] (fax) [FreeTextEntry1] : cerumen impaction AU [FreeTextEntry5] : copious cerumen AU removed, TMs normal.

## 2020-06-11 NOTE — REASON FOR VISIT
[FreeTextEntry2] : a follow up visit after parathyroidectomy primary hyperparathyroidism and parathyroidectomy [FreeTextEntry1] : Referred by Bakari Becker MD Endocrinologist,  PCP is Aaron Grimes MD [address]

## 2021-06-10 ENCOUNTER — APPOINTMENT (OUTPATIENT)
Dept: OTOLARYNGOLOGY | Facility: CLINIC | Age: 81
End: 2021-06-10
Payer: MEDICARE

## 2021-06-22 ENCOUNTER — APPOINTMENT (OUTPATIENT)
Dept: OTOLARYNGOLOGY | Facility: CLINIC | Age: 81
End: 2021-06-22
Payer: MEDICARE

## 2021-06-22 VITALS
TEMPERATURE: 97.2 F | RESPIRATION RATE: 15 BRPM | BODY MASS INDEX: 23.19 KG/M2 | WEIGHT: 126 LBS | OXYGEN SATURATION: 98 % | HEART RATE: 58 BPM | DIASTOLIC BLOOD PRESSURE: 77 MMHG | HEIGHT: 62 IN | SYSTOLIC BLOOD PRESSURE: 177 MMHG

## 2021-06-22 VITALS — SYSTOLIC BLOOD PRESSURE: 167 MMHG | DIASTOLIC BLOOD PRESSURE: 75 MMHG

## 2021-06-22 DIAGNOSIS — M81.0 AGE-RELATED OSTEOPOROSIS W/OUT CURRENT PATHOLOGICAL FRACTURE: ICD-10-CM

## 2021-06-22 DIAGNOSIS — L91.0 HYPERTROPHIC SCAR: ICD-10-CM

## 2021-06-22 PROCEDURE — 99215 OFFICE O/P EST HI 40 MIN: CPT | Mod: 25

## 2021-06-22 PROCEDURE — 31575 DIAGNOSTIC LARYNGOSCOPY: CPT

## 2021-06-22 PROCEDURE — 11900 INJECT SKIN LESIONS </W 7: CPT

## 2021-06-22 PROCEDURE — 99072 ADDL SUPL MATRL&STAF TM PHE: CPT

## 2021-06-22 NOTE — PROCEDURE
[Image(s) Captured] : image(s) captured and filed [Unable to Cooperate with Mirror] : patient unable to cooperate with mirror [Gag Reflex] : gag reflex preventing mirror examination [None] : none [Topical Lidocaine] : topical lidocaine [Flexible Endoscope] : examined with the flexible endoscope [Serial Number: ___] : Serial Number: [unfilled] [de-identified] : The nasal septum is minimally deviated to the right. There are no masses or polyps and the nasal mucosa and secretions are normal. The choanae and posterior nasopharynx are normal without masses or drainage. The Eustachian tube orifices appear patent. The pharynx, including the posterior and lateral pharyngeal walls, the vallecula and base of tongue are normal without ulcerations, lesions or masses. The hypopharynx including the pyriform sinuses open well without pooling of secretions, mucosal lesions or masses. The supraglottic larynx including the epiglottis, petiole, arytenoids, glossoepiglottic, aryepiglottic and pharyngoepiglottic folds are normal without mucosal lesions, ulcerations or masses. The glottis reveals normal false vocal folds. The true vocal folds are glistening white, tense and of equal length, without paralysis, having symmetric mobility on adduction and abduction. There are no mucosal lesions, nodules, cysts, erythroplasia or leukoplakia. The posterior cricoid area has healthy pink mucosa in the interarytenoid area and esophageal inlet. There is mild thickening/edema of the interarytenoid mucosa suggestive of posterior laryngitis from laryngopharyngeal acid reflux disease. The trachea is clear without narrowing in the immediate subglottic region, without deviation or lesions. \par  [de-identified] : follow up exam

## 2021-06-22 NOTE — PROCEDURE
[Image(s) Captured] : image(s) captured and filed [Unable to Cooperate with Mirror] : patient unable to cooperate with mirror [Gag Reflex] : gag reflex preventing mirror examination [None] : none [Topical Lidocaine] : topical lidocaine [Serial Number: ___] : Serial Number: [unfilled] [de-identified] : The nasal septum is minimally deviated to the right. There are no masses or polyps and the nasal mucosa and secretions are normal. The choanae and posterior nasopharynx are normal without masses or drainage. The Eustachian tube orifices appear patent. The pharynx, including the posterior and lateral pharyngeal walls, the vallecula and base of tongue are normal without ulcerations, lesions or masses. The hypopharynx including the pyriform sinuses open well without pooling of secretions, mucosal lesions or masses. The supraglottic larynx including the epiglottis, petiole, arytenoids, glossoepiglottic, aryepiglottic and pharyngoepiglottic folds are normal without mucosal lesions, ulcerations or masses. The glottis reveals normal false vocal folds. The true vocal folds are glistening white, tense and of equal length, without paralysis, having symmetric mobility on adduction and abduction. There are no mucosal lesions, nodules, cysts, erythroplasia or leukoplakia. The posterior cricoid area has healthy pink mucosa in the interarytenoid area and esophageal inlet. There is mild thickening/edema of the interarytenoid mucosa suggestive of posterior laryngitis from laryngopharyngeal acid reflux disease. The trachea is clear without narrowing in the immediate subglottic region, without deviation or lesions. \par  [de-identified] : follow up after parathyroidectomy and thyroid nodules

## 2021-06-22 NOTE — CONSULT LETTER
[Dear  ___] : Dear  [unfilled], [Consult Letter:] : I had the pleasure of evaluating your patient, [unfilled]. [Please see my note below.] : Please see my note below. [Consult Closing:] : Thank you very much for allowing me to participate in the care of this patient.  If you have any questions, please do not hesitate to contact me. [Sincerely,] : Sincerely, [FreeTextEntry3] : \par Ned Harman M.D., FACS, ECNU\par Director Center for Thyroid & Parathyroid Surgery\par [facility]\par Certified in Thyroid/Parathyroid/Neck Ultrasound, ECNU/ AIUM\par \par , Department of Otolaryngology\par [facility] at [facility]\par

## 2021-06-22 NOTE — REASON FOR VISIT
[FreeTextEntry2] : a follow up visit after parathyroidectomy primary hyperparathyroidism and parathyroidectomy [FreeTextEntry1] : Referred by Bakari Becker MD Endocrinologist,  PCP is Aaron Grimes MD [address]

## 2021-06-22 NOTE — HISTORY OF PRESENT ILLNESS
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  Her last serum calcium was as high as 11.6 pg/ml with an elevated iPTH to 263 pg/ml, elevated Alkaline phosphatase  and has normal renal function. Vitamin D 25-OH total is low at 19 ng/ml. She has osteoporosis and bone/joint pain.  Her DEXA scan showed t scores of -3.2, -2.7 and -3.3 in the lumbar spine, left total hip and left radius respectively. Virginia denies recent shortness of breath, voice changes, dysphagia, anterior neck pain, neck pressure or mass. There is no family history of thyroid cancer. She denies any known radiation exposures in her youth.   She denies calcium, vitamin D supplements, HCTZ or past use of Lithium Carbonate. There is no family history of nephrolithiasis or renal disease.  There is no history of fragility bone fractures. Other than fatigue,  generalized bone aches, joint pain, polyuria/ polydipsia, and constipation she denies depression, memory loss, brain fog, nausea, vomiting, abdominal pain, nephrolithiasis, peptic ulcer disease, pancreatitis or GERD. Her weight has been stable and she is euthyroid. She has controlled HTN and type 2 DM. \par  [FreeTextEntry1] : Virginia had an uneventful parathyroidectomy on 11/08/19. She had an appropriate IOPTH drop into the normal range (333 to 23.1).  Surgical path was c/w a right low lying superior parathyroid adenoma (2.5 cm, 1.8 gm). A left inferior parathyroid gland was borderline enlarged and partially resected (0.02 g). She is taking Vitamin D3 2,000 IU and Caltrate 600 mg/ day.  She had seen Dr. Becker back and her calcium/PTH were (10.2/25.4).  Her incision had become hypertrophic and was injected with Kenalog. She continues to complain of scar pruritus and thickening. She had an 8 mm dominant left mid-lower lobe thyroid nodule with microcalcifications that needs to be monitored for growth.  Overall she feels well but concerned about COVID infection.  She denies fever, body aches, cough, cyanosis, chest burning, anosmia or recent known COVID exposures.  All family members at home are well. She has a f/u visit with Dr. Becker as her HbA1C is elevated.

## 2021-06-22 NOTE — HISTORY OF PRESENT ILLNESS
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  Her last serum calcium was as high as 11.6 pg/ml with an elevated iPTH to 263 pg/ml, elevated Alkaline phosphatase  and has normal renal function. Vitamin D 25-OH total is low at 19 ng/ml. She has osteoporosis and bone/joint pain.  Her DEXA scan showed t scores of -3.2, -2.7 and -3.3 in the lumbar spine, left total hip and left radius respectively. [place] denies recent shortness of breath, voice changes, dysphagia, anterior neck pain, neck pressure or mass. There is no family history of thyroid cancer. She denies any known radiation exposures in her youth.   She denies calcium, vitamin D supplements, HCTZ or past use of Lithium Carbonate. There is no family history of nephrolithiasis or renal disease.  There is no history of fragility bone fractures. Other than fatigue,  generalized bone aches, joint pain, polyuria/ polydipsia, and constipation she denies depression, memory loss, brain fog, nausea, vomiting, abdominal pain, nephrolithiasis, peptic ulcer disease, pancreatitis or GERD. Her weight has been stable and she is euthyroid. She has controlled HTN and type 2 DM. \par  [FreeTextEntry1] : Virginia had an uneventful parathyroidectomy on 11/08/19. She had an appropriate IOPTH drop into the normal range (333 to 23.1).  Surgical path was c/w a right low lying superior parathyroid adenoma (2.5 cm, 1.8 gm). A left inferior parathyroid gland was borderline enlarged and partially resected (0.02 g). She is taking Vitamin D3 2,000 IU and Caltrate 600 mg/ day.  She had seen Dr. Becker back and her calcium/PTH have remained normal.  Her incision had become hypertrophic and was injected with Kenalog last year and despite this she continues to complain of daily pruritus and thickening of the scar.  She also continues to complain of generalized bone and joint pain that she manages with her rheumatologist and ibuprofen.  She had an 8 mm dominant left mid-lower lobe thyroid nodule with microcalcifications that needs to be monitored for growth but was stable last summer on office US.  She denies fever, body aches, cough, cyanosis, chest burning, anosmia or recent known COVID exposures.  All family members at home are well. She is now vaccinated. She has a f/u visit with Dr. Becker in July to better manage her diabetes.

## 2021-06-22 NOTE — REASON FOR VISIT
[FreeTextEntry2] : a follow up visit after parathyroidectomy primary hyperparathyroidism and parathyroidectomy [FreeTextEntry1] : Referred by Bakari Becker MD Endocrinologist,  PCP is Aaron Grimes MD [address]

## 2021-06-22 NOTE — CONSULT LETTER
[Dear  ___] : Dear  [unfilled], [Consult Letter:] : I had the pleasure of evaluating your patient, [unfilled]. [Please see my note below.] : Please see my note below. [Consult Closing:] : Thank you very much for allowing me to participate in the care of this patient.  If you have any questions, please do not hesitate to contact me. [Sincerely,] : Sincerely, [FreeTextEntry3] : \par Ned Harman M.D., FACS, ECNU\par Director Center for Thyroid & Parathyroid Surgery\par [facility]\par Certified in Thyroid/Parathyroid/Neck Ultrasound, ECNU/ AIUM\par \par , Department of Otolaryngology\par [facility] [employer] at [facility]\par

## 2022-06-30 ENCOUNTER — APPOINTMENT (OUTPATIENT)
Dept: OTOLARYNGOLOGY | Facility: CLINIC | Age: 82
End: 2022-06-30

## 2022-06-30 VITALS
HEART RATE: 59 BPM | OXYGEN SATURATION: 97 % | DIASTOLIC BLOOD PRESSURE: 72 MMHG | RESPIRATION RATE: 14 BRPM | HEIGHT: 62 IN | TEMPERATURE: 97 F | WEIGHT: 122 LBS | BODY MASS INDEX: 22.45 KG/M2 | SYSTOLIC BLOOD PRESSURE: 128 MMHG

## 2022-06-30 DIAGNOSIS — E55.9 VITAMIN D DEFICIENCY, UNSPECIFIED: ICD-10-CM

## 2022-06-30 DIAGNOSIS — N20.0 CALCULUS OF KIDNEY: ICD-10-CM

## 2022-06-30 DIAGNOSIS — R09.82 POSTNASAL DRIP: ICD-10-CM

## 2022-06-30 PROCEDURE — 76536 US EXAM OF HEAD AND NECK: CPT

## 2022-06-30 PROCEDURE — 31575 DIAGNOSTIC LARYNGOSCOPY: CPT

## 2022-06-30 PROCEDURE — 99215 OFFICE O/P EST HI 40 MIN: CPT | Mod: 25

## 2022-06-30 RX ORDER — AMLODIPINE BESYLATE 10 MG/1
10 TABLET ORAL
Qty: 90 | Refills: 0 | Status: ACTIVE | COMMUNITY
Start: 2022-04-04

## 2022-06-30 RX ORDER — IBUPROFEN 600 MG/1
600 TABLET, FILM COATED ORAL
Qty: 21 | Refills: 0 | Status: ACTIVE | COMMUNITY
Start: 2022-04-26

## 2022-06-30 RX ORDER — ACETAMINOPHEN AND CODEINE 300; 30 MG/1; MG/1
300-30 TABLET ORAL
Qty: 18 | Refills: 0 | Status: COMPLETED | COMMUNITY
Start: 2019-11-05 | End: 2022-06-30

## 2022-06-30 RX ORDER — CICLOPIROX OLAMINE 7.7 MG/G
0.77 CREAM TOPICAL
Qty: 90 | Refills: 0 | Status: ACTIVE | COMMUNITY
Start: 2022-06-20

## 2022-06-30 RX ORDER — REPAGLINIDE 2 MG/1
2 TABLET ORAL
Qty: 270 | Refills: 0 | Status: ACTIVE | COMMUNITY
Start: 2022-06-23

## 2022-06-30 RX ORDER — AMOXICILLIN AND CLAVULANATE POTASSIUM 500; 125 MG/1; MG/1
500-125 TABLET, FILM COATED ORAL
Qty: 21 | Refills: 0 | Status: ACTIVE | COMMUNITY
Start: 2022-01-19

## 2022-06-30 RX ORDER — AMOXICILLIN AND CLAVULANATE POTASSIUM 875; 125 MG/1; MG/1
875-125 TABLET, COATED ORAL
Qty: 10 | Refills: 0 | Status: ACTIVE | COMMUNITY
Start: 2022-01-11

## 2022-06-30 RX ORDER — LINAGLIPTIN 5 MG/1
5 TABLET, FILM COATED ORAL
Qty: 90 | Refills: 0 | Status: ACTIVE | COMMUNITY
Start: 2022-04-28

## 2022-06-30 RX ORDER — AZITHROMYCIN 500 MG/1
500 TABLET, FILM COATED ORAL DAILY
Qty: 1 | Refills: 0 | Status: COMPLETED | COMMUNITY
Start: 2019-11-05 | End: 2022-06-30

## 2022-06-30 RX ORDER — FLUTICASONE PROPIONATE 50 UG/1
50 SPRAY, METERED NASAL
Qty: 1 | Refills: 3 | Status: ACTIVE | COMMUNITY
Start: 2022-06-30 | End: 1900-01-01

## 2022-06-30 RX ORDER — DORZOLAMIDE HYDROCHLORIDE TIMOLOL MALEATE 20; 5 MG/ML; MG/ML
22.3-6.8 SOLUTION/ DROPS OPHTHALMIC
Qty: 10 | Refills: 0 | Status: ACTIVE | COMMUNITY
Start: 2022-04-19

## 2022-06-30 RX ORDER — AMOXICILLIN 500 MG/1
500 TABLET, FILM COATED ORAL
Qty: 21 | Refills: 0 | Status: ACTIVE | COMMUNITY
Start: 2022-04-26

## 2022-06-30 NOTE — REASON FOR VISIT
[FreeTextEntry2] : a follow up visit after parathyroidectomy for primary hyperparathyroidism and parathyroidectomy [FreeTextEntry1] : Referred by Bakari Becker MD Endocrinologist,  PCP is Aaron Grimes MD [address]

## 2022-06-30 NOTE — PROCEDURE
[Image(s) Captured] : image(s) captured and filed [Unable to Cooperate with Mirror] : patient unable to cooperate with mirror [Gag Reflex] : gag reflex preventing mirror examination [None] : none [Topical Lidocaine] : topical lidocaine [Serial Number: ___] : Serial Number: [unfilled] [FreeTextEntry3] : [facility]\par THYROID/NECK ULTRASOUND REPORT\par \par NAME: AMELIA DESAI.. MR# [id number]..	 : 1940..	 DATE: 2022\par \par HISTORY/ INDICATIONS: 81-year-old female status post parathyroidectomy/bilateral neck exploration and autoimmune thyroiditis with multiple micro-nodules noted on preoperative ultrasound.  A left mid to lower lobe nodule reportedly had microcalcifications for followup exam.\par \par COMPARISON: Outside report dated 10/31/19, [facility].\par \par PROCEDURE: Physician performed high-resolution ultrasound gray scale imaging and color Doppler supplementation of the thyroid gland and neck was obtained in the longitudinal and transverse planes using a 13 MHz linear transducer with image capture. All measurements are in centimeters (longitudinal x AP x transverse). \par \par FINDINGS: Overall the thyroid gland is normal in size, heterogeneous in echotexture with slightly increased vascularity on color Doppler flow. \par \par RIGHT LOBE: Is not enlarged, heterogeneous, with slightly increased vascularity on color Doppler and measures 4.14 x 1.63 x 1.87 cm. NODULES: Within the right upper lobe there is a smoothly marginated, hypoechoic nodule without microcalcifications that is wider than tall with grade 2 vascularity and measures 0.43 x 0.19 x 0.32 cm (previously 0.52 x 0.16 x 0.37 cm, stable). A right mid lobe nodule is also hypoechoic, smoothly marginated, without microcalcifications, grade 2 vascularity and wider than tall measuring 0.34 x 0.17 x 0.30 cm (previously 0.45 x 0.19 x 0.39 cm, stable). There are several additional 0,35 cm pseudo-micronodules without calcifications.\par \par ISTHMUS: Measures 0.22 cm in AP dimension and is heterogeneous in echotexture with normal vascularity.  A right isthmus nodule is mildly hypoechoic smoothly marginated without microcalcifications and wider than tall that measures 0.28 x 0.16 x 0.32 cm not previously identified.\par \par LEFT LOBE: Is not enlarged, heterogeneous, with slightly increased vascularity on color Doppler and measures 3.77 x 1.28 x 1.91 cm. NODULES: Within the mid to lower lobe there is a smoothly marginated, heterogeneous, hypoechoic nodule, with grade 2 vascularity, and several punctate echogenic foci some having reverberation artifact and others without artifact that is wider than tall and measures 0.71 x 0.41 x 0.80 cm (previously 0.58 x 0.48 x 0.57 cm, slightly larger but within the range of measurement error and stable).  There are 2 additional micronodules not previously well-defined measuring up to 3 mm in the left mid to upper and mid to lower lobes.  Neither have microcalcifications and are not suspicious for malignancy.\par  \par PARATHYROID GLANDS: There are no identified enlarged parathyroid glands in the central neck compartment. \par \par LYMPH NODES: There are several benign appearing subcentimeter lymph nodes identified at neck levels III bilaterally, all with echogenic hilar lines, no calcifications or cystic degeneration and have a short long axis ratio < 0.5 in the transverse plane.\par \par IMPRESSION: 81-year-old female with a small, heterogeneous thyroid gland with multiple bilateral subcentimeter pseudonodules consistent with Hashimoto's thyroiditis. A dominant left lower lobe nodule is stable and still does not meet current guidelines to require FNA biopsy.\par \par RECOMMENDATIONS: Repeat thyroid ultrasound in 2-3 years as well as continued monitoring of TSH. \par \par Electronically signed by Ned Harman MD on 2022, 2:58 PM.\par \par [facility]\par [address], [address], [place] [address]\par [phone] (voice), [phone] (fax) [de-identified] : The nasal septum is minimally deviated to the right. There are no masses or polyps and the nasal mucosa and secretions are normal. The choanae and posterior nasopharynx are normal without masses or drainage. The Eustachian tube orifices appear patent. The pharynx, including the posterior and lateral pharyngeal walls, the vallecula and base of tongue are normal without ulcerations, lesions or masses. The hypopharynx including the pyriform sinuses open well without pooling of secretions, mucosal lesions or masses. The supraglottic larynx including the epiglottis, petiole, arytenoids, glossoepiglottic, aryepiglottic and pharyngoepiglottic folds are normal without mucosal lesions, ulcerations or masses. The glottis reveals normal false vocal folds. The true vocal folds are glistening white, tense and of equal length, without paralysis, having symmetric mobility on adduction and abduction. There are no mucosal lesions, nodules, cysts, erythroplasia or leukoplakia. The posterior cricoid area has healthy pink mucosa in the interarytenoid area and esophageal inlet. There is moderate thickening/edema of the interarytenoid mucosa suggestive of posterior laryngitis from laryngopharyngeal acid reflux disease. The trachea is clear without narrowing in the immediate subglottic region, without deviation or lesions. \par  [de-identified] : follow up after parathyroidectomy and thyroid nodules

## 2022-06-30 NOTE — HISTORY OF PRESENT ILLNESS
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  Her last serum calcium was as high as 11.6 pg/ml with an elevated iPTH to 263 pg/ml, elevated Alkaline phosphatase  and has normal renal function. Vitamin D 25-OH total is low at 19 ng/ml. She has osteoporosis and bone/joint pain.  Her DEXA scan showed t scores of -3.2, -2.7 and -3.3 in the lumbar spine, left total hip and left radius respectively. Virginia denies recent shortness of breath, voice changes, dysphagia, anterior neck pain, neck pressure or mass. There is no family history of thyroid cancer. She denies any known radiation exposures in her youth.   She denies calcium, vitamin D supplements, HCTZ or past use of Lithium Carbonate. There is no family history of nephrolithiasis or renal disease.  There is no history of fragility bone fractures. Other than fatigue,  generalized bone aches, joint pain, polyuria/ polydipsia, and constipation she denies depression, memory loss, brain fog, nausea, vomiting, abdominal pain, nephrolithiasis, peptic ulcer disease, pancreatitis or GERD. Her weight has been stable and she is euthyroid. She has controlled HTN and type 2 DM. \par  [FreeTextEntry1] : Virginia had an uneventful parathyroidectomy on 11/08/19. She had an appropriate IOPTH drop into the normal range (333 to 23.1).  Surgical path was c/w a right low lying superior parathyroid adenoma (2.5 cm, 1.8 gm). A left inferior parathyroid gland was borderline enlarged and partially resected (0.02 g). She is taking Vitamin D3 2,000 IU and Caltrate 600 mg/ day.  She had seen Dr. Becker back and her calcium/PTH have remained normal but she has not had repeat labs this year.  Her incision had become hypertrophic and was injected with Kenalog last year and despite this she continues to complain of daily pruritus and thickening of the scar.  She had a left sided renal stone last October documented on abdominal CT that was dissolved with oral medications. She also continues to complain of generalized bone and joint pain that she manages with her rheumatologist and ibuprofen. She has stiffness in her LE and lower back from spinal stenosis.  She had an 8 mm dominant left mid-lower lobe thyroid nodule with microcalcifications that needs to be monitored for growth but was stable in the past on office US.  She denies fever, body aches, cough, cyanosis, chest burning, anosmia or recent known COVID exposures.  All family members at home are well. She is now vaccinated and boosted. She has a f/u visit with Dr. Becker in July to better manage her diabetes.

## 2022-06-30 NOTE — CONSULT LETTER
[Dear  ___] : Dear  [unfilled], [Consult Letter:] : I had the pleasure of evaluating your patient, [unfilled]. [Please see my note below.] : Please see my note below. [Consult Closing:] : Thank you very much for allowing me to participate in the care of this patient.  If you have any questions, please do not hesitate to contact me. [Sincerely,] : Sincerely, [FreeTextEntry3] : \par Ned Harman M.D., FACS, ECNU\par Director [facility] Thyroid [facility] Parathyroid Surgery\par [facility] [facility]\par Certified in Thyroid/Parathyroid/Neck Ultrasound, ECNU/ AIUM\par \par , Department of Otolaryngology\par [facility] [employer] at [facility]\par

## 2023-07-06 ENCOUNTER — APPOINTMENT (OUTPATIENT)
Dept: OTOLARYNGOLOGY | Facility: CLINIC | Age: 83
End: 2023-07-06
Payer: MEDICARE

## 2023-07-06 VITALS
BODY MASS INDEX: 21.71 KG/M2 | TEMPERATURE: 98.3 F | DIASTOLIC BLOOD PRESSURE: 83 MMHG | HEART RATE: 73 BPM | SYSTOLIC BLOOD PRESSURE: 152 MMHG | HEIGHT: 62 IN | WEIGHT: 118 LBS | OXYGEN SATURATION: 97 %

## 2023-07-06 DIAGNOSIS — D35.1 BENIGN NEOPLASM OF PARATHYROID GLAND: ICD-10-CM

## 2023-07-06 DIAGNOSIS — E21.0 PRIMARY HYPERPARATHYROIDISM: ICD-10-CM

## 2023-07-06 DIAGNOSIS — M81.0 AGE-RELATED OSTEOPOROSIS W/OUT CURRENT PATHOLOGICAL FRACTURE: ICD-10-CM

## 2023-07-06 DIAGNOSIS — D44.0 NEOPLASM OF UNCERTAIN BEHAVIOR OF THYROID GLAND: ICD-10-CM

## 2023-07-06 DIAGNOSIS — H61.23 IMPACTED CERUMEN, BILATERAL: ICD-10-CM

## 2023-07-06 DIAGNOSIS — E89.2 POSTPROCEDURAL HYPOPARATHYROIDISM: ICD-10-CM

## 2023-07-06 PROCEDURE — G0268 REMOVAL OF IMPACTED WAX MD: CPT

## 2023-07-06 PROCEDURE — 99214 OFFICE O/P EST MOD 30 MIN: CPT | Mod: 25

## 2023-07-06 NOTE — HISTORY OF PRESENT ILLNESS
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  Her last serum calcium was as high as 11.6 pg/ml with an elevated iPTH to 263 pg/ml, elevated Alkaline phosphatase  and has normal renal function. Vitamin D 25-OH total is low at 19 ng/ml. She has osteoporosis and bone/joint pain.  Her DEXA scan showed t scores of -3.2, -2.7 and -3.3 in the lumbar spine, left total hip and left radius respectively. Virginia denies recent shortness of breath, voice changes, dysphagia, anterior neck pain, neck pressure or mass. There is no family history of thyroid cancer. She denies any known radiation exposures in her youth.   She denies calcium, vitamin D supplements, HCTZ or past use of Lithium Carbonate. There is no family history of nephrolithiasis or renal disease.  There is no history of fragility bone fractures. Other than fatigue,  generalized bone aches, joint pain, polyuria/ polydipsia, and constipation she denies depression, memory loss, brain fog, nausea, vomiting, abdominal pain, nephrolithiasis, peptic ulcer disease, pancreatitis or GERD. Her weight has been stable and she is euthyroid. She has controlled HTN and type 2 DM. \par  [FreeTextEntry1] : Virginia is an 82 year-old female who had an uneventful parathyroidectomy on 11/08/19. She had an appropriate IOPTH drop into the normal range (333 to 23.1).  Surgical path was c/w a right low lying superior parathyroid adenoma (2.5 cm, 1.8 gm).  A left inferior parathyroid gland was borderline enlarged and partially resected (0.02 g). She is taking Vitamin D3 2,000 IU and Caltrate 600 mg/ day.  She had seen Dr. Becker back this past January and her calcium has remained normal.  Her incision had become hypertrophic and was injected with Kenalog with a good result.  She had a left sided renal stone in 2021 documented on abdominal CT that was dissolved with oral medications. She also continues to complain of generalized bone and joint pain that she manages with her rheumatologist and Aleve. She has stiffness in her LE and lower back from spinal stenosis.  She may need a procedure but uncertain what type. She had an 8 mm dominant left mid-lower lobe thyroid nodule with microcalcifications that needs to be monitored for growth but was stable last year on office US. She denies fever, body aches, cough, cyanosis, chest burning, anosmia or recent known COVID exposures.  All family members at home are well. She is now vaccinated and boosted. She has a f/u visit with Dr. Becker for her type 2 DM. [place] denies recent shortness of breath, voice changes, dysphagia, anterior neck pain, neck pressure or mass. She denies fever, body aches, cough, cyanosis, chest burning, anosmia or recent known COVID exposures.  All family members at home are well.

## 2023-07-06 NOTE — CONSULT LETTER
[Dear  ___] : Dear  [unfilled], [Consult Letter:] : I had the pleasure of evaluating your patient, [unfilled]. [Please see my note below.] : Please see my note below. [Consult Closing:] : Thank you very much for allowing me to participate in the care of this patient.  If you have any questions, please do not hesitate to contact me. [Sincerely,] : Sincerely, [FreeTextEntry3] : \par Ned Harman M.D., FACS, ECNU\par Director Center for Thyroid & Parathyroid Surgery\par [facility] [place] [facility]\par Certified in Thyroid/Parathyroid/Neck Ultrasound, ECNU/ AIUM\par \par , Department of Otolaryngology\par [facility] [employer] at [facility]\par

## 2023-07-06 NOTE — PROCEDURE
[Cerumen Impaction] : Cerumen Impaction [Same] : same as the Pre Op Dx. [] : Removal of Cerumen [FreeTextEntry1] : Bilateral impacted cerumen and HL [FreeTextEntry6] : Impacted cerumen removed AU with curettes. Both TMs are normal.  Procedure well tolerated. Recommend debrox 5 drops weekly.

## 2023-07-06 NOTE — REASON FOR VISIT
[FreeTextEntry2] : a follow up visit after parathyroidectomy for primary hyperparathyroidism and parathyroidectomy [FreeTextEntry1] : Referred by Bakari Becker MD Endocrinologist,  PCP is Aaron Grimes MD [address]

## 2025-02-27 ENCOUNTER — APPOINTMENT (OUTPATIENT)
Dept: OTOLARYNGOLOGY | Facility: CLINIC | Age: 85
End: 2025-02-27

## 2025-02-28 ENCOUNTER — APPOINTMENT (OUTPATIENT)
Dept: OTOLARYNGOLOGY | Facility: CLINIC | Age: 85
End: 2025-02-28
Payer: MEDICARE

## 2025-02-28 ENCOUNTER — NON-APPOINTMENT (OUTPATIENT)
Age: 85
End: 2025-02-28

## 2025-02-28 VITALS
TEMPERATURE: 97.5 F | BODY MASS INDEX: 23.03 KG/M2 | SYSTOLIC BLOOD PRESSURE: 170 MMHG | HEART RATE: 70 BPM | DIASTOLIC BLOOD PRESSURE: 79 MMHG | WEIGHT: 122 LBS | OXYGEN SATURATION: 97 % | HEIGHT: 61 IN

## 2025-02-28 DIAGNOSIS — H61.23 IMPACTED CERUMEN, BILATERAL: ICD-10-CM

## 2025-02-28 DIAGNOSIS — D44.0 NEOPLASM OF UNCERTAIN BEHAVIOR OF THYROID GLAND: ICD-10-CM

## 2025-02-28 DIAGNOSIS — Z98.890 OTHER SPECIFIED POSTPROCEDURAL STATES: ICD-10-CM

## 2025-02-28 DIAGNOSIS — E21.0 PRIMARY HYPERPARATHYROIDISM: ICD-10-CM

## 2025-02-28 DIAGNOSIS — Z90.89 OTHER SPECIFIED POSTPROCEDURAL STATES: ICD-10-CM

## 2025-02-28 DIAGNOSIS — M81.0 AGE-RELATED OSTEOPOROSIS W/OUT CURRENT PATHOLOGICAL FRACTURE: ICD-10-CM

## 2025-02-28 DIAGNOSIS — K21.9 GASTRO-ESOPHAGEAL REFLUX DISEASE W/OUT ESOPHAGITIS: ICD-10-CM

## 2025-02-28 DIAGNOSIS — E55.9 VITAMIN D DEFICIENCY, UNSPECIFIED: ICD-10-CM

## 2025-02-28 PROCEDURE — 31575 DIAGNOSTIC LARYNGOSCOPY: CPT

## 2025-02-28 PROCEDURE — 99214 OFFICE O/P EST MOD 30 MIN: CPT | Mod: 25

## 2025-02-28 PROCEDURE — G0268 REMOVAL OF IMPACTED WAX MD: CPT
